# Patient Record
Sex: MALE | Race: WHITE | Employment: FULL TIME | URBAN - METROPOLITAN AREA
[De-identification: names, ages, dates, MRNs, and addresses within clinical notes are randomized per-mention and may not be internally consistent; named-entity substitution may affect disease eponyms.]

---

## 2017-01-04 ENCOUNTER — OFFICE VISIT (OUTPATIENT)
Dept: NEUROLOGY | Facility: CLINIC | Age: 32
End: 2017-01-04

## 2017-01-04 VITALS
HEART RATE: 84 BPM | RESPIRATION RATE: 16 BRPM | SYSTOLIC BLOOD PRESSURE: 132 MMHG | BODY MASS INDEX: 27 KG/M2 | DIASTOLIC BLOOD PRESSURE: 78 MMHG | WEIGHT: 185 LBS

## 2017-01-04 DIAGNOSIS — G89.29 CHRONIC PAIN OF LEFT KNEE: Primary | ICD-10-CM

## 2017-01-04 DIAGNOSIS — M25.562 CHRONIC PAIN OF LEFT KNEE: Primary | ICD-10-CM

## 2017-01-04 PROCEDURE — 99214 OFFICE O/P EST MOD 30 MIN: CPT | Performed by: OTHER

## 2017-02-01 ENCOUNTER — OFFICE VISIT (OUTPATIENT)
Dept: NEUROLOGY | Facility: CLINIC | Age: 32
End: 2017-02-01

## 2017-02-01 VITALS
BODY MASS INDEX: 27 KG/M2 | SYSTOLIC BLOOD PRESSURE: 140 MMHG | DIASTOLIC BLOOD PRESSURE: 82 MMHG | RESPIRATION RATE: 16 BRPM | WEIGHT: 185 LBS | HEART RATE: 80 BPM

## 2017-02-01 DIAGNOSIS — G54.9 PLEXOPATHY: Primary | ICD-10-CM

## 2017-02-01 PROCEDURE — 99214 OFFICE O/P EST MOD 30 MIN: CPT | Performed by: OTHER

## 2017-02-01 RX ORDER — GABAPENTIN 300 MG/1
300 CAPSULE ORAL NIGHTLY
Qty: 90 CAPSULE | Refills: 3 | Status: SHIPPED | OUTPATIENT
Start: 2017-02-01

## 2017-02-01 NOTE — PROGRESS NOTES
The pain continues to slowly improve. Left anterior thigh knee to the foot along with paresthesias in the foot. He does state originally the pain started in the left hip, inguinal area. He is eager to return to work.   However he states he needs to work

## 2022-02-23 NOTE — PROGRESS NOTES
He relates pain in the left leg is markedly decreased from a 9 to a 4. Is now left distal anterior thigh knee anterior tibial pain. Paresthesias in the left toes. No pain in the lumbar spine and left hip.   I reviewed the results of the MRI scan of the p
19-Jan-2022

## (undated) NOTE — MR AVS SNAPSHOT
[facility]  [address], [address]  [address] [facility] [phone]               Thank you for choosing us for your health care visit with Sy Carney MD, MD.  We are glad to serve you and happy to provide you with this summary of Make half your plate fruits and vegetables Highly refined, white starches including white bread, rice and pasta   Eat plenty of protein, keep the fat content low Sugars:  sodas and sports drinks, candies and desserts   Eat plenty of low-fat dairy products

## (undated) NOTE — MR AVS SNAPSHOT
[facility]  [address], [address] [facility] [place] [facility]  [employer] [phone]               Thank you for choosing us for your health care visit with Linette Baumann MD, MD.  We are glad to serve you and happy to provide you with this summary of Please consider the following Lifestyle Modifications. Also, please return for a follow-up Blood Pressure Check in 1 month.      Lifestyle Modification Recommendations:    Modification Recommendation   Weight Reduction Maintain normal body weight (body mas

## (undated) NOTE — Clinical Note
2017        To Whom It May Concern:    Mr. Storm Spatz, : [birth year], is under my care for a problem involving his left leg. I am releasing him for a trial of work with no restrictions, effective 17.    Mr. Jenna Quijano will follow up with me a

## (undated) NOTE — Clinical Note
[address] [facility]  [address], [address]  [place] [phone]        Dear Fanny Doran      I have referred Mr. Kari Gómez to your office to rule out structural, orthopedic etiology for his ongoing persistent pain